# Patient Record
Sex: FEMALE | Race: WHITE | NOT HISPANIC OR LATINO | Employment: OTHER | URBAN - METROPOLITAN AREA
[De-identification: names, ages, dates, MRNs, and addresses within clinical notes are randomized per-mention and may not be internally consistent; named-entity substitution may affect disease eponyms.]

---

## 2017-02-24 DIAGNOSIS — E11.9 TYPE 2 DIABETES MELLITUS WITHOUT COMPLICATION, WITHOUT LONG-TERM CURRENT USE OF INSULIN (H): ICD-10-CM

## 2017-02-24 RX ORDER — METFORMIN HYDROCHLORIDE EXTENDED-RELEASE TABLETS 1000 MG/1
1000 TABLET, FILM COATED, EXTENDED RELEASE ORAL 2 TIMES DAILY
Qty: 90 TABLET | Refills: 0 | Status: SHIPPED | OUTPATIENT
Start: 2017-02-24 | End: 2017-02-27

## 2017-02-24 NOTE — TELEPHONE ENCOUNTER
Metformin         Last Written Prescription Date: 10/18/16  Last Fill Quantity: 90, # refills: 1  Last Office Visit with [facility], [facility] or [facility] prescribing provider:  11/1/16        BP Readings from Last 3 Encounters:   11/02/16 124/70   11/01/16 122/80   11/01/16 120/74     Lab Results   Component Value Date    MICROL 11 08/29/2016     No results found for: MICROALBUMIN  Creatinine   Date Value Ref Range Status   08/29/2016 0.61 0.52 - 1.04 mg/dL Final   ]  GFR Estimate   Date Value Ref Range Status   08/29/2016 >90  Non  GFR Calc   >60 mL/min/1.7m2 Final   09/11/2015 >90  Non  GFR Calc   >60 mL/min/1.7m2 Final     GFR Estimate If Black   Date Value Ref Range Status   08/29/2016 >90   GFR Calc   >60 mL/min/1.7m2 Final   09/11/2015 >90   GFR Calc   >60 mL/min/1.7m2 Final     Lab Results   Component Value Date    CHOL 134 02/29/2016     Lab Results   Component Value Date    HDL 71 02/29/2016     Lab Results   Component Value Date    LDL 50 02/29/2016     Lab Results   Component Value Date    TRIG 64 02/29/2016     Lab Results   Component Value Date    CHOLHDLRATIO 2.7 09/11/2015     Lab Results   Component Value Date    AST 13 12/14/2014     Lab Results   Component Value Date    ALT 24 12/14/2014     Lab Results   Component Value Date    A1C 6.7 08/29/2016    A1C 7.6 02/29/2016    A1C 6.5 09/11/2015    A1C 6.2 10/18/2012     Potassium   Date Value Ref Range Status   09/11/2015 4.1 3.4 - 5.3 mmol/L Final

## 2017-02-26 ENCOUNTER — TELEPHONE (OUTPATIENT)
Dept: FAMILY MEDICINE | Facility: CLINIC | Age: 62
End: 2017-02-26

## 2017-02-26 DIAGNOSIS — E11.9 TYPE 2 DIABETES MELLITUS WITHOUT COMPLICATION, WITHOUT LONG-TERM CURRENT USE OF INSULIN (H): Primary | ICD-10-CM

## 2017-02-26 NOTE — TELEPHONE ENCOUNTER
Note from pharmacy:  Metformin osmotic 1000 mg requested is very expensive.  May we use generic Glucophage xr 500 mg and have La take 2 tablets to make 1000mg dose twice daily?

## 2017-02-27 RX ORDER — METFORMIN HCL 500 MG
2000 TABLET, EXTENDED RELEASE 24 HR ORAL
Qty: 360 TABLET | Refills: 0 | Status: SHIPPED | OUTPATIENT
Start: 2017-02-27 | End: 2017-05-18

## 2017-02-27 NOTE — TELEPHONE ENCOUNTER
Patient notified that medication was changed and is due for a diabetic check.  She says she moved to [place], [place] and will be seeing someone there.  Amber Nieto RN

## 2017-02-27 NOTE — TELEPHONE ENCOUNTER
Prescription changed, was already cued to [facility] so it was sent there.  Patient due for diabetic recheck.

## 2017-05-18 DIAGNOSIS — E11.9 TYPE 2 DIABETES MELLITUS WITHOUT COMPLICATION, WITHOUT LONG-TERM CURRENT USE OF INSULIN (H): ICD-10-CM

## 2017-05-18 RX ORDER — METFORMIN HCL 500 MG
2000 TABLET, EXTENDED RELEASE 24 HR ORAL
Qty: 360 TABLET | Refills: 0 | Status: SHIPPED | OUTPATIENT
Start: 2017-05-18

## 2017-05-18 NOTE — TELEPHONE ENCOUNTER
Metformin 500 mg         Last Written Prescription Date: 2/27/17  Last Fill Quantity: 360, # refills: 0  Last Office Visit with [facility], [facility] or [facility] prescribing provider:  1/1/16        BP Readings from Last 3 Encounters:   11/02/16 124/70   11/01/16 122/80   11/01/16 120/74     Lab Results   Component Value Date    MICROL 11 08/29/2016     Lab Results   Component Value Date    UMALCR 7.30 08/29/2016     Creatinine   Date Value Ref Range Status   08/29/2016 0.61 0.52 - 1.04 mg/dL Final   ]  GFR Estimate   Date Value Ref Range Status   08/29/2016 >90  Non  GFR Calc   >60 mL/min/1.7m2 Final   09/11/2015 >90  Non  GFR Calc   >60 mL/min/1.7m2 Final     GFR Estimate If Black   Date Value Ref Range Status   08/29/2016 >90   GFR Calc   >60 mL/min/1.7m2 Final   09/11/2015 >90   GFR Calc   >60 mL/min/1.7m2 Final     Lab Results   Component Value Date    CHOL 134 02/29/2016     Lab Results   Component Value Date    HDL 71 02/29/2016     Lab Results   Component Value Date    LDL 50 02/29/2016     Lab Results   Component Value Date    TRIG 64 02/29/2016     Lab Results   Component Value Date    CHOLHDLRATIO 2.7 09/11/2015     Lab Results   Component Value Date    AST 13 12/14/2014     Lab Results   Component Value Date    ALT 24 12/14/2014     Lab Results   Component Value Date    A1C 6.7 08/29/2016    A1C 7.6 02/29/2016    A1C 6.5 09/11/2015    A1C 6.2 10/18/2012     Potassium   Date Value Ref Range Status   09/11/2015 4.1 3.4 - 5.3 mmol/L Final

## 2017-06-10 ENCOUNTER — HEALTH MAINTENANCE LETTER (OUTPATIENT)
Age: 62
End: 2017-06-10

## 2017-09-20 ENCOUNTER — TELEPHONE (OUTPATIENT)
Dept: FAMILY MEDICINE | Facility: CLINIC | Age: 62
End: 2017-09-20

## 2017-09-20 NOTE — TELEPHONE ENCOUNTER
Panel Management Review      Patient has the following on her problem list:     Diabetes    ASA: Passed    Last A1C  Lab Results   Component Value Date    A1C 6.7 08/29/2016    A1C 7.6 02/29/2016    A1C 6.5 09/11/2015    A1C 6.2 10/18/2012     A1C tested: FAILED    Last LDL:    Lab Results   Component Value Date    CHOL 134 02/29/2016     Lab Results   Component Value Date    HDL 71 02/29/2016     Lab Results   Component Value Date    LDL 50 02/29/2016     Lab Results   Component Value Date    TRIG 64 02/29/2016     Lab Results   Component Value Date    CHOLHDLRATIO 2.7 09/11/2015     Lab Results   Component Value Date    NHDL 63 02/29/2016       Is the patient on a Statin? YES             Is the patient on Aspirin? YES    Medications     HMG CoA Reductase Inhibitors    simvastatin (ZOCOR) 40 MG tablet    Salicylates    aspirin 81 MG tablet          Last three blood pressure readings:  BP Readings from Last 3 Encounters:   11/02/16 124/70   11/01/16 122/80   11/01/16 120/74       Date of last diabetes office visit: 10/18/2016     Tobacco History:     History   Smoking Status     Former Smoker     Packs/day: 0.50     Years: 20.00     Quit date: 8/12/2016   Smokeless Tobacco     Not on file             Composite cancer screening  Chart review shows that this patient is due/due soon for the following Colonoscopy  Summary:    Patient is due/failing the following:   A1C and COLONOSCOPY    Action needed:   Patient needs office visit for Diabetes and health maintenance.    Type of outreach:    Sent letter. Phone not in service    Questions for provider review:    None                                                                                                                                    Karli Kohler MA

## 2017-11-14 ENCOUNTER — TELEPHONE (OUTPATIENT)
Dept: FAMILY MEDICINE | Facility: CLINIC | Age: 62
End: 2017-11-14

## 2017-11-14 NOTE — TELEPHONE ENCOUNTER
Panel Management Review      Patient has the following on her problem list:     Diabetes    ASA: Passed    Last A1C  Lab Results   Component Value Date    A1C 6.7 08/29/2016    A1C 7.6 02/29/2016    A1C 6.5 09/11/2015    A1C 6.2 10/18/2012     A1C tested: needs a1c     Last LDL:    Lab Results   Component Value Date    CHOL 134 02/29/2016     Lab Results   Component Value Date    HDL 71 02/29/2016     Lab Results   Component Value Date    LDL 50 02/29/2016     Lab Results   Component Value Date    TRIG 64 02/29/2016     Lab Results   Component Value Date    CHOLHDLRATIO 2.7 09/11/2015     Lab Results   Component Value Date    NHDL 63 02/29/2016       Is the patient on a Statin? YES             Is the patient on Aspirin? YES    Medications     HMG CoA Reductase Inhibitors    simvastatin (ZOCOR) 40 MG tablet    Salicylates    aspirin 81 MG tablet          Last three blood pressure readings:  BP Readings from Last 3 Encounters:   11/02/16 124/70   11/01/16 122/80   11/01/16 120/74       Date of last diabetes office visit: 10/18/2016     Tobacco History:     History   Smoking Status     Former Smoker     Packs/day: 0.50     Years: 20.00     Quit date: 8/12/2016   Smokeless Tobacco     Not on file         Hypertension   Last three blood pressure readings:  BP Readings from Last 3 Encounters:   11/02/16 124/70   11/01/16 122/80   11/01/16 120/74     Blood pressure: Passed    HTN Guidelines:  Age 18-59 BP range:  Less than 140/90  Age 60-85 with Diabetes:  Less than 140/90  Age 60-85 without Diabetes:  less than 150/90          Composite cancer screening  Chart review shows that this patient is due/due soon for the following Colonoscopy  Summary:    Patient is due/failing the following:   A1C and COLONOSCOPY    Action needed:   Needs diabetic check     Type of outreach:    Phone, left message for patient to call back.     Questions for provider review:    None                                                                                                                                     Manjula Lopez, CMA

## 2018-01-05 ENCOUNTER — TELEPHONE (OUTPATIENT)
Dept: FAMILY MEDICINE | Facility: CLINIC | Age: 63
End: 2018-01-05

## 2018-01-05 NOTE — TELEPHONE ENCOUNTER
"01/05/2018      Patient Communication Preferences indicate  Do not contact  and/or communication by \"Phone\" is not preferred. Call not required per Outreach team.      Outreach ,  Noreen Rubi     "

## 2021-09-21 ENCOUNTER — TRANSFERRED RECORDS (OUTPATIENT)
Dept: PHYSICAL THERAPY | Facility: CLINIC | Age: 66
End: 2021-09-21

## 2021-09-23 ENCOUNTER — TRANSFERRED RECORDS (OUTPATIENT)
Dept: PHYSICAL THERAPY | Facility: CLINIC | Age: 66
End: 2021-09-23

## 2021-09-28 ENCOUNTER — THERAPY VISIT (OUTPATIENT)
Dept: PHYSICAL THERAPY | Facility: CLINIC | Age: 66
End: 2021-09-28
Payer: COMMERCIAL

## 2021-09-28 DIAGNOSIS — M76.61 TENDONITIS, ACHILLES, RIGHT: ICD-10-CM

## 2021-09-28 DIAGNOSIS — M25.571 PAIN IN JOINT, ANKLE AND FOOT, RIGHT: ICD-10-CM

## 2021-09-28 PROCEDURE — 97110 THERAPEUTIC EXERCISES: CPT | Mod: GP | Performed by: PHYSICAL THERAPIST

## 2021-09-28 PROCEDURE — 97161 PT EVAL LOW COMPLEX 20 MIN: CPT | Mod: GP | Performed by: PHYSICAL THERAPIST

## 2021-09-28 PROCEDURE — 97140 MANUAL THERAPY 1/> REGIONS: CPT | Mod: GP | Performed by: PHYSICAL THERAPIST

## 2021-09-28 NOTE — LETTER
[facility]  [address] [place]  [facility] [address]  [phone]    2021    Re: La Geiger   :   1955  MRN:  [id number]   REFERRING PHYSICIAN:   Eleazar ROLDAN [facility]    Date of Initial Evaluation:  2021  Visits:  Rxs Used: 1  Reason for Referral:     Pain in joint, ankle and foot, right  Tendonitis, Achilles, right    EVALUATION SUMMARY    Physical Therapy Initial Evaluation  Subjective:  The history is provided by the patient. No  was used.   Patient Health History  La Geiger being seen for R foot and ankle pain.     Problem occurred: unknown   Pain is reported as 7/10 on pain scale.  Health rating: pt did not report.  Pertinent medical history includes: high blood pressure, smoking, overweight and diabetes.   Red flags:  None as reported by patient.  Medical allergies: none.   Surgeries include:  None.    Current medications:  Anti-inflammatory and high blood pressure medication.    Current occupation is retired.   Primary job tasks include:  Other.             Therapist Generated HPI Evaluation  Problem details: This issue started about 4 months ago. Pt had been doing a [employer] workout video without shoes on and thinks maybe that could have brought some of this up. She also walks daily but does have pain with this and is not able to walk more than a couple blocks. She was given a boot last Thursday and is not wearing it today because it bothers the bottom of her foot. She has tried icing and stretching at home and does notice that they help her pain. Pt last saw doctor on 21..         Type of problem:  Right ankle.    This is a chronic condition.  Condition occurred with:  Other reason.  Where condition occurred: for unknown reasons.  Patient reports pain:  Lateral and lower leg (achilles and lateral ankle).      Re: La Geiger   :    1955      Pain is described as aching and burning and is constant.  Pain radiates to:  No radiation. Pain is the same all the time.  Since onset symptoms are unchanged.  Associated symptoms:  Loss of motion/stiffness. Symptoms are exacerbated by walking  and relieved by bracing/immobilizing, NSAID's and ice.  Special tests included:  MRI.    Work activity restrictions: retired.  Barriers include:  None as reported by patient.                  Objective:  Gait:    Gait Type:  Normal       Ankle/Foot Evaluation  ROM:  PROM is normal ( except R ankle DF: 8 degrees).  Strength:    Dorsiflexion:  Left: 4+/5     Pain:   Right: 4+/5   Pain:  Plantarflexion: Left: 5-/5   Pain:   Right: 4/5  Pain:  Inversion:Left: 4+/5  Pain:     Right: 4+/5  Pain:  Eversion:Left: 4+/5  Pain:  Right: 4+/5  Pain:  LIGAMENT TESTING: normal  SPECIAL TESTS: Special tests ankle: negative windlass test.  Right ankle positive for the following special tests:   Grewal sign  PALPATION:   Right ankle tenderness present at:   gastroc/soleus; achilles tendon and plantar fascia  EDEMA: Edema ankle: moderate edema noted at R achilles.      Able to perform a single leg heel raise B but notes pain at achilles on the R side.    Assessment/Plan:    Patient is a 66 year old female with right side ankle complaints.    Patient has the following significant findings with corresponding treatment plan.                Diagnosis 1:  R achilles tendonitis  Pain -  hot/cold therapy, manual therapy, splint/taping/bracing/orthotics, self management, education and home program  Decreased ROM/flexibility - manual therapy, therapeutic exercise, therapeutic activity and home program  Decreased strength - therapeutic exercise, therapeutic activities and home program  Edema - cold therapy and self management/home program  Impaired muscle performance - neuro re-education and home program  Decreased function - therapeutic activities and home program    Therapy Evaluation  Codes:   1) History comprised of:   Personal factors that impact the plan of care:      None.    Comorbidity factors that impact the plan of care are:      None.      Re: La Geiger   :   1955         Medications impacting care: Anti-inflammatory and High blood pressure.  2) Examination of Body Systems comprised of:   Body structures and functions that impact the plan of care:      Ankle.   Activity limitations that impact the plan of care are:      Stairs and Walking.  3) Clinical presentation characteristics are:   Stable/Uncomplicated.  4) Decision-Making    Low complexity using standardized patient assessment instrument and/or measureable assessment of functional outcome.  Cumulative Therapy Evaluation is: Low complexity.    Previous and current functional limitations:  (See Goal Flow Sheet for this information)    Short term and Long term goals: (See Goal Flow Sheet for this information)     Communication ability:  Patient appears to be able to clearly communicate and understand verbal and written communication and follow directions correctly.  Treatment Explanation - The following has been discussed with the patient:   RX ordered/plan of care  Anticipated outcomes  Possible risks and side effects  This patient would benefit from PT intervention to resume normal activities.   Rehab potential is good.    Frequency:  1 X week, once daily  Duration:  for 6-8 weeks  Discharge Plan:  Achieve all LTG.  Independent in home treatment program.  Reach maximal therapeutic benefit.            Thank you for your referral.    INQUIRIES  Therapist:Raya White PT   [facility] [place]  [address] [place] [address]  Phone: [phone]

## 2021-09-28 NOTE — PROGRESS NOTES
Physical Therapy Initial Evaluation  Subjective:  The history is provided by the patient. No  was used.   Patient Health History  La Geiger being seen for R foot and ankle pain.       Problem occurred: unknown   Pain is reported as 7/10 on pain scale.  Health rating: pt did not report.  Pertinent medical history includes: high blood pressure, smoking, overweight and diabetes.   Red flags:  None as reported by patient.  Medical allergies: none.   Surgeries include:  None.    Current medications:  Anti-inflammatory and high blood pressure medication.    Current occupation is retired.   Primary job tasks include:  Other.                  Therapist Generated HPI Evaluation  Problem details: This issue started about 4 months ago. Pt had been doing a [employer] workout video without shoes on and thinks maybe that could have brought some of this up. She also walks daily but does have pain with this and is not able to walk more than a couple blocks. She was given a boot last Thursday and is not wearing it today because it bothers the bottom of her foot. She has tried icing and stretching at home and does notice that they help her pain. Pt last saw doctor on 9/23/21..         Type of problem:  Right ankle.    This is a chronic condition.  Condition occurred with:  Other reason.  Where condition occurred: for unknown reasons.  Patient reports pain:  Lateral and lower leg (achilles and lateral ankle).  Pain is described as aching and burning and is constant.  Pain radiates to:  No radiation. Pain is the same all the time.  Since onset symptoms are unchanged.  Associated symptoms:  Loss of motion/stiffness. Symptoms are exacerbated by walking  and relieved by bracing/immobilizing, NSAID's and ice.  Special tests included:  MRI.    Work activity restrictions: retired.  Barriers include:  None as reported by patient.                        Objective:    Gait:    Gait Type:  Normal               Ankle/Foot  Evaluation  ROM:  PROM is normal ( except R ankle DF: 8 degrees).      Strength:    Dorsiflexion:  Left: 4+/5     Pain:   Right: 4+/5   Pain:  Plantarflexion: Left: 5-/5   Pain:   Right: 4/5  Pain:  Inversion:Left: 4+/5  Pain:     Right: 4+/5  Pain:  Eversion:Left: 4+/5  Pain:  Right: 4+/5  Pain:                  LIGAMENT TESTING: normal              SPECIAL TESTS: Special tests ankle: negative windlass test.    Right ankle positive for the following special tests:   Grewal sign  PALPATION:     Right ankle tenderness present at:   gastroc/soleus; achilles tendon and plantar fascia  EDEMA: Edema ankle: moderate edema noted at R achilles.                                                          Able to perform a single leg heel raise B but notes pain at achilles on the R side.    General     ROS    Assessment/Plan:    Patient is a 66 year old female with right side ankle complaints.    Patient has the following significant findings with corresponding treatment plan.                Diagnosis 1:  R achilles tendonitis  Pain -  hot/cold therapy, manual therapy, splint/taping/bracing/orthotics, self management, education and home program  Decreased ROM/flexibility - manual therapy, therapeutic exercise, therapeutic activity and home program  Decreased strength - therapeutic exercise, therapeutic activities and home program  Edema - cold therapy and self management/home program  Impaired muscle performance - neuro re-education and home program  Decreased function - therapeutic activities and home program    Therapy Evaluation Codes:   1) History comprised of:   Personal factors that impact the plan of care:      None.    Comorbidity factors that impact the plan of care are:      None.     Medications impacting care: Anti-inflammatory and High blood pressure.  2) Examination of Body Systems comprised of:   Body structures and functions that impact the plan of care:      Ankle.   Activity limitations that impact the plan  of care are:      Stairs and Walking.  3) Clinical presentation characteristics are:   Stable/Uncomplicated.  4) Decision-Making    Low complexity using standardized patient assessment instrument and/or measureable assessment of functional outcome.  Cumulative Therapy Evaluation is: Low complexity.    Previous and current functional limitations:  (See Goal Flow Sheet for this information)    Short term and Long term goals: (See Goal Flow Sheet for this information)     Communication ability:  Patient appears to be able to clearly communicate and understand verbal and written communication and follow directions correctly.  Treatment Explanation - The following has been discussed with the patient:   RX ordered/plan of care  Anticipated outcomes  Possible risks and side effects  This patient would benefit from PT intervention to resume normal activities.   Rehab potential is good.    Frequency:  1 X week, once daily  Duration:  for 6-8 weeks  Discharge Plan:  Achieve all LTG.  Independent in home treatment program.  Reach maximal therapeutic benefit.    Please refer to the daily flowsheet for treatment today, total treatment time and time spent performing 1:1 timed codes.

## 2021-10-07 ENCOUNTER — THERAPY VISIT (OUTPATIENT)
Dept: PHYSICAL THERAPY | Facility: CLINIC | Age: 66
End: 2021-10-07
Payer: COMMERCIAL

## 2021-10-07 DIAGNOSIS — M25.571 PAIN IN JOINT, ANKLE AND FOOT, RIGHT: ICD-10-CM

## 2021-10-07 DIAGNOSIS — M54.42 ACUTE LEFT-SIDED LOW BACK PAIN WITH LEFT-SIDED SCIATICA: ICD-10-CM

## 2021-10-07 DIAGNOSIS — M76.61 TENDONITIS, ACHILLES, RIGHT: ICD-10-CM

## 2021-10-07 PROCEDURE — 97110 THERAPEUTIC EXERCISES: CPT | Mod: GP | Performed by: PHYSICAL THERAPIST

## 2021-10-07 PROCEDURE — 97161 PT EVAL LOW COMPLEX 20 MIN: CPT | Mod: GP | Performed by: PHYSICAL THERAPIST

## 2021-10-07 NOTE — PROGRESS NOTES
Physical Therapy Initial Evaluation  Subjective:  The history is provided by the patient. No  was used.   Patient Health History  La Geiger being seen for back pain.       Problem occurred: unknown   Pain is reported as 2/10 (9 when driving, 2 when standing) on pain scale.  General health as reported by patient is good.  Pertinent medical history includes: diabetes, overweight, smoking and high blood pressure.   Red flags:  None as reported by patient.  Medical allergies: none.   Surgeries include:  None.    Current medications:  Anti-inflammatory and high blood pressure medication.    Current occupation is retired.   Primary job tasks include:  Other.                  Therapist Generated HPI Evaluation  Problem details: Pt had an MRI in January of 2020. She reports that she has a bulging disc. She has shooting pain going down the left leg. She also notes pain on the R SI joint. She had been to the chiropractor, massages, and tried stretching. Walking does not bother her. She reports that wearing a boot for her R ankle actually makes the back pain better. Sitting is the most bothersome especially for longer periods of time. Using a seat heater when driving longer distances helps a lot. She also tries ice and that helps with the pain.  .         Type of problem:  Sacroiliac and lumbar.    This is a chronic condition.  Condition occurred with:  Other reason.  Where condition occurred: for unknown reasons.  Patient reports pain:  SI joint right.  Pain is described as shooting and is intermittent.  Pain radiates to:  Gluteals left, thigh left, knee left and lower leg left. Pain is worse in the P.M. (worse in the evening when sitting a lot).    Associated symptoms:  Numbness.  and relieved by heat, ice and activity/movement (stretching, walking).  Special tests included:  MRI.  Previous treatment includes chiropractic. There was moderate improvement following previous treatment.                           Objective:  System         Lumbar/SI Evaluation  ROM:    AROM Lumbar:   Flexion:          75%  Ext:                    75%   Side Bend:        Left:  WNL    Right:  WNL  Rotation:           Left:     Right:   Side Glide:        Left:     Right:                   Neural Tension/Mobility:    Left side:  SLR positive.     Right side:   SLR  negative.           SI joint/Sacrum:    Negative stork test - however, difficulty with balance and weightshift      Left negative at:    Thigh thrust    Right negative at:  Thigh thrust                                      Hip Evaluation    Hip Strength:      Extension:  Left: 4/5  Pain:Right: 4/5    Pain:    Abduction:  Left: 4/5     Pain:Right: 4-/5    Pain:                  Hip Special Testing:      Left hip negative for the following special tests:  Piriformis or Juan   Right hip positive for the following special tests:  PiriformisRight hip negative for the following special tests:  Juan                 General     ROS    Assessment/Plan:    Patient is a 66 year old female with lumbar and sacral complaints.    Patient has the following significant findings with corresponding treatment plan.                Diagnosis 1:  Back pain with L sided sciatica Pain -  hot/cold therapy, manual therapy, self management, education, directional preference exercise and home program  Decreased ROM/flexibility - manual therapy, therapeutic exercise, therapeutic activity and home program  Decreased strength - therapeutic exercise, therapeutic activities and home program  Impaired balance - neuro re-education, gait training, therapeutic activities and home program  Impaired muscle performance - electric stimulation, neuro re-education and home program  Decreased function - therapeutic activities and home program    Therapy Evaluation Codes:   1) History comprised of:   Personal factors that impact the plan of care:      None.    Comorbidity factors that impact the plan of care are:       None.     Medications impacting care: Anti-inflammatory and High blood pressure.  2) Examination of Body Systems comprised of:   Body structures and functions that impact the plan of care:      Lumbar spine and Sacral illiac joint.   Activity limitations that impact the plan of care are:      Sitting.  3) Clinical presentation characteristics are:   Stable/Uncomplicated.  4) Decision-Making    Low complexity using standardized patient assessment instrument and/or measureable assessment of functional outcome.  Cumulative Therapy Evaluation is: Low complexity.    Previous and current functional limitations:  (See Goal Flow Sheet for this information)    Short term and Long term goals: (See Goal Flow Sheet for this information)     Communication ability:  Patient appears to be able to clearly communicate and understand verbal and written communication and follow directions correctly.  Treatment Explanation - The following has been discussed with the patient:   RX ordered/plan of care  Anticipated outcomes  Possible risks and side effects  This patient would benefit from PT intervention to resume normal activities.   Rehab potential is good.    Frequency:  1 X week, once daily  Duration:  for 8 weeks  Discharge Plan:  Achieve all LTG.  Independent in home treatment program.  Reach maximal therapeutic benefit.    Please refer to the daily flowsheet for treatment today, total treatment time and time spent performing 1:1 timed codes.

## 2021-10-14 ENCOUNTER — THERAPY VISIT (OUTPATIENT)
Dept: PHYSICAL THERAPY | Facility: CLINIC | Age: 66
End: 2021-10-14
Payer: COMMERCIAL

## 2021-10-14 DIAGNOSIS — M25.571 PAIN IN JOINT, ANKLE AND FOOT, RIGHT: ICD-10-CM

## 2021-10-14 DIAGNOSIS — M76.61 TENDONITIS, ACHILLES, RIGHT: ICD-10-CM

## 2021-10-14 DIAGNOSIS — M54.42 ACUTE LEFT-SIDED LOW BACK PAIN WITH LEFT-SIDED SCIATICA: ICD-10-CM

## 2021-10-14 PROCEDURE — 99207 PR NO CHARGE LOS: CPT | Mod: GP | Performed by: PHYSICAL THERAPIST

## 2021-10-18 ENCOUNTER — THERAPY VISIT (OUTPATIENT)
Dept: PHYSICAL THERAPY | Facility: CLINIC | Age: 66
End: 2021-10-18
Payer: COMMERCIAL

## 2021-10-18 DIAGNOSIS — M25.571 PAIN IN JOINT, ANKLE AND FOOT, RIGHT: ICD-10-CM

## 2021-10-18 DIAGNOSIS — M54.42 ACUTE LEFT-SIDED LOW BACK PAIN WITH LEFT-SIDED SCIATICA: ICD-10-CM

## 2021-10-18 DIAGNOSIS — M76.61 TENDONITIS, ACHILLES, RIGHT: ICD-10-CM

## 2021-10-18 PROCEDURE — 97110 THERAPEUTIC EXERCISES: CPT | Mod: GP | Performed by: PHYSICAL THERAPIST

## 2021-10-18 PROCEDURE — 97140 MANUAL THERAPY 1/> REGIONS: CPT | Mod: GP | Performed by: PHYSICAL THERAPIST

## 2021-11-05 ENCOUNTER — THERAPY VISIT (OUTPATIENT)
Dept: PHYSICAL THERAPY | Facility: CLINIC | Age: 66
End: 2021-11-05
Payer: COMMERCIAL

## 2021-11-05 DIAGNOSIS — M25.571 PAIN IN JOINT, ANKLE AND FOOT, RIGHT: ICD-10-CM

## 2021-11-05 DIAGNOSIS — M76.61 TENDONITIS, ACHILLES, RIGHT: ICD-10-CM

## 2021-11-05 DIAGNOSIS — M54.42 ACUTE LEFT-SIDED LOW BACK PAIN WITH LEFT-SIDED SCIATICA: ICD-10-CM

## 2021-11-05 PROCEDURE — 97140 MANUAL THERAPY 1/> REGIONS: CPT | Mod: GP | Performed by: PHYSICAL THERAPIST

## 2021-11-05 PROCEDURE — 97110 THERAPEUTIC EXERCISES: CPT | Mod: GP | Performed by: PHYSICAL THERAPIST

## 2021-11-11 ENCOUNTER — THERAPY VISIT (OUTPATIENT)
Dept: PHYSICAL THERAPY | Facility: CLINIC | Age: 66
End: 2021-11-11
Payer: COMMERCIAL

## 2021-11-11 DIAGNOSIS — M54.42 ACUTE LEFT-SIDED LOW BACK PAIN WITH LEFT-SIDED SCIATICA: ICD-10-CM

## 2021-11-11 DIAGNOSIS — M25.571 PAIN IN JOINT, ANKLE AND FOOT, RIGHT: ICD-10-CM

## 2021-11-11 DIAGNOSIS — M76.61 TENDONITIS, ACHILLES, RIGHT: ICD-10-CM

## 2021-11-11 PROCEDURE — 97110 THERAPEUTIC EXERCISES: CPT | Mod: GP | Performed by: PHYSICAL THERAPIST

## 2021-11-11 PROCEDURE — 97112 NEUROMUSCULAR REEDUCATION: CPT | Mod: GP | Performed by: PHYSICAL THERAPIST

## 2021-11-11 PROCEDURE — 97530 THERAPEUTIC ACTIVITIES: CPT | Mod: GP | Performed by: PHYSICAL THERAPIST

## 2021-11-18 ENCOUNTER — THERAPY VISIT (OUTPATIENT)
Dept: PHYSICAL THERAPY | Facility: CLINIC | Age: 66
End: 2021-11-18
Payer: COMMERCIAL

## 2021-11-18 DIAGNOSIS — M25.571 PAIN IN JOINT, ANKLE AND FOOT, RIGHT: ICD-10-CM

## 2021-11-18 DIAGNOSIS — M76.61 TENDONITIS, ACHILLES, RIGHT: ICD-10-CM

## 2021-11-18 PROCEDURE — 97110 THERAPEUTIC EXERCISES: CPT | Mod: GP | Performed by: PHYSICAL THERAPIST

## 2021-11-18 PROCEDURE — 97112 NEUROMUSCULAR REEDUCATION: CPT | Mod: GP | Performed by: PHYSICAL THERAPIST

## 2021-11-18 PROCEDURE — 97530 THERAPEUTIC ACTIVITIES: CPT | Mod: GP | Performed by: PHYSICAL THERAPIST

## 2021-12-16 ENCOUNTER — THERAPY VISIT (OUTPATIENT)
Dept: PHYSICAL THERAPY | Facility: CLINIC | Age: 66
End: 2021-12-16
Payer: COMMERCIAL

## 2021-12-16 DIAGNOSIS — M54.42 ACUTE LEFT-SIDED LOW BACK PAIN WITH LEFT-SIDED SCIATICA: Primary | ICD-10-CM

## 2021-12-16 DIAGNOSIS — M76.61 TENDONITIS, ACHILLES, RIGHT: ICD-10-CM

## 2021-12-16 DIAGNOSIS — M25.571 PAIN IN JOINT, ANKLE AND FOOT, RIGHT: ICD-10-CM

## 2021-12-16 PROCEDURE — 97110 THERAPEUTIC EXERCISES: CPT | Mod: GP | Performed by: PHYSICAL THERAPIST

## 2021-12-16 PROCEDURE — 97112 NEUROMUSCULAR REEDUCATION: CPT | Mod: GP | Performed by: PHYSICAL THERAPIST

## 2021-12-16 PROCEDURE — 97530 THERAPEUTIC ACTIVITIES: CPT | Mod: GP | Performed by: PHYSICAL THERAPIST

## 2021-12-16 NOTE — PROGRESS NOTES
Subjective:  HPI  Physical Exam                    Objective:  System    Physical Exam    General     ROS    Assessment/Plan:    PROGRESS  REPORT    Progress reporting period is from 9/28/21 to 12/16/21.       SUBJECTIVE  Subjective changes noted by patient:  Patient reports she's doing really good overall. Denies any pain at all in the foot now or in the back. Still having a little pain in her sciatic nerve area however. Has been stretching, using the band, walking for exercise which is going rewally well.      Current pain level is 0/10  .     Previous pain level was  0/10  .   Changes in function:  Yes (See Goal flowsheet attached for changes in current functional level)  Adverse reaction to treatment or activity: None    OBJECTIVE  Changes noted in objective findings:  Yes, Lumbar ROM: WNL - pain. SLR negative B. No tenderness to palpation in B piriformis. Ankle ROM is WNL B with strength 5/5 thoughout. Improved ability to balance SLS on uneven surfaces. Minimal LOB noted. Normal gait pattern. Good closed chain quad control with step down.        ASSESSMENT/PLAN  Updated problem list and treatment plan: Diagnosis 1:  Foot pain  Pain -  manual therapy, self management, education and home program  Decreased ROM/flexibility - manual therapy, therapeutic exercise, therapeutic activity and home program  Decreased strength - therapeutic exercise, therapeutic activities and home program  Impaired balance - neuro re-education, therapeutic activities and home program  Decreased proprioception - neuro re-education, therapeutic activities and home program  Impaired gait - gait training and home program  Impaired muscle performance - neuro re-education and home program  Decreased function - therapeutic activities and home program  Diagnosis 2:  LBP   Pain -  manual therapy, self management, education and home program  Decreased ROM/flexibility - manual therapy, therapeutic exercise, therapeutic activity and home  program  Decreased strength - therapeutic exercise, therapeutic activities and home program  Impaired balance - neuro re-education, therapeutic activities and home program  Decreased proprioception - neuro re-education, therapeutic activities and home program  Impaired muscle performance - neuro re-education and home program  Decreased function - therapeutic activities and home program  Impaired posture - neuro re-education, therapeutic activities and home program  STG/LTGs have been met or progress has been made towards goals:  Yes (See Goal flow sheet completed today.)  Assessment of Progress: The patient's condition is improving.  Patient is meeting short term goals and is progressing towards long term goals.  Self Management Plans:  Patient is independent in a home treatment program.  Patient is independent in self management of symptoms.    La continues to require the following intervention to meet STG and LTG's:  PT intervention is no longer required to meet STG/LTG.    Recommendations:  Pt to try things on her own for 3-4 weeks. Will call if problems arise or will discontinue at that time if tolerated.    Please refer to the daily flowsheet for treatment today, total treatment time and time spent performing 1:1 timed codes.

## 2022-02-13 ENCOUNTER — HEALTH MAINTENANCE LETTER (OUTPATIENT)
Age: 67
End: 2022-02-13

## 2022-07-08 PROBLEM — M54.42 ACUTE LEFT-SIDED LOW BACK PAIN WITH LEFT-SIDED SCIATICA: Status: RESOLVED | Noted: 2021-10-07 | Resolved: 2022-07-08

## 2022-07-08 PROBLEM — M76.61 TENDONITIS, ACHILLES, RIGHT: Status: RESOLVED | Noted: 2021-09-28 | Resolved: 2022-07-08

## 2022-07-08 PROBLEM — M25.571 PAIN IN JOINT, ANKLE AND FOOT, RIGHT: Status: RESOLVED | Noted: 2021-09-28 | Resolved: 2022-07-08

## 2022-07-08 NOTE — PROGRESS NOTES
Patient did not return for further treatment and no additional progress was noted.  Please refer to the progress note and goal flowsheet completed on 12/16/22   for discharge information.

## 2022-10-15 ENCOUNTER — HEALTH MAINTENANCE LETTER (OUTPATIENT)
Age: 67
End: 2022-10-15

## 2023-03-26 ENCOUNTER — HEALTH MAINTENANCE LETTER (OUTPATIENT)
Age: 68
End: 2023-03-26

## 2023-08-26 ENCOUNTER — HEALTH MAINTENANCE LETTER (OUTPATIENT)
Age: 68
End: 2023-08-26

## 2024-03-17 ENCOUNTER — HEALTH MAINTENANCE LETTER (OUTPATIENT)
Age: 69
End: 2024-03-17

## 2024-05-26 ENCOUNTER — HEALTH MAINTENANCE LETTER (OUTPATIENT)
Age: 69
End: 2024-05-26

## 2024-10-13 ENCOUNTER — HEALTH MAINTENANCE LETTER (OUTPATIENT)
Age: 69
End: 2024-10-13

## 2025-05-08 ENCOUNTER — PATIENT OUTREACH (OUTPATIENT)
Dept: CARE COORDINATION | Facility: CLINIC | Age: 70
End: 2025-05-08
Payer: COMMERCIAL